# Patient Record
Sex: FEMALE | Race: ASIAN | NOT HISPANIC OR LATINO
[De-identification: names, ages, dates, MRNs, and addresses within clinical notes are randomized per-mention and may not be internally consistent; named-entity substitution may affect disease eponyms.]

---

## 2021-04-21 ENCOUNTER — APPOINTMENT (OUTPATIENT)
Dept: ORTHOPEDIC SURGERY | Facility: CLINIC | Age: 61
End: 2021-04-21

## 2022-10-12 ENCOUNTER — NON-APPOINTMENT (OUTPATIENT)
Age: 62
End: 2022-10-12

## 2022-10-12 ENCOUNTER — APPOINTMENT (OUTPATIENT)
Dept: ORTHOPEDIC SURGERY | Facility: CLINIC | Age: 62
End: 2022-10-12

## 2022-10-12 VITALS — HEIGHT: 58 IN | WEIGHT: 105 LBS | BODY MASS INDEX: 22.04 KG/M2

## 2022-10-12 DIAGNOSIS — G89.29 CERVICALGIA: ICD-10-CM

## 2022-10-12 DIAGNOSIS — M54.2 CERVICALGIA: ICD-10-CM

## 2022-10-12 DIAGNOSIS — Z86.79 PERSONAL HISTORY OF OTHER DISEASES OF THE CIRCULATORY SYSTEM: ICD-10-CM

## 2022-10-12 PROBLEM — Z00.00 ENCOUNTER FOR PREVENTIVE HEALTH EXAMINATION: Status: ACTIVE | Noted: 2022-10-12

## 2022-10-12 PROCEDURE — 99204 OFFICE O/P NEW MOD 45 MIN: CPT

## 2022-10-12 PROCEDURE — 72050 X-RAY EXAM NECK SPINE 4/5VWS: CPT

## 2022-10-12 NOTE — ASSESSMENT
[FreeTextEntry1] : 62 year old female presents with acute exacerbation of chronic neck pain.  She had a fall in 2020 but unsure if it is related.  She denies radicular pain. At times she has numbness in her hands in the morning. She is otherwise neurologically intact. The patient was given a referral for physical therapy.  She was given prescription for Meloxicam.  She will followup in 4-6 weeks. We discussed red flag symptoms that would require emergent evaluation. She knows to call with any questions or concerns or if her symptoms acutely worsen.

## 2022-10-12 NOTE — HISTORY OF PRESENT ILLNESS
[de-identified] : 62 year old female presents with acute exacerbation of chronic neck pain.  She had a fall in 2020 but unsure if it is related.  She denies radicular pain, recent illness, fevers, weakness, balance problems, saddle anesthesia, urinary retention or fecal incontinence. At times she has numbness in her hands in the morning.  She takes Tylenol with some relief.

## 2022-10-12 NOTE — PHYSICAL EXAM
[de-identified] : General: No acute distress, conversant, well-nourished.\par Head: Normocephalic, atraumatic\par Neck: trachea midline, FROM\par Heart: normotensive and normal rate and rhythm\par Lungs: No labored breathing\par Skin: No abrasions, no rashes, no edema\par Psych: Alert and oriented to person, place and time\par Extremities: no peripheral edema or digital cyanosis\par Gait: Normal gait. Can perform tandem gait.  \par Vascular: warm and well perfused distally, palpable distal pulses\par \par MSK:\par Spine: \par No tenderness to palpation.  No step-off, no deformity.\par \par NEURO:\par Sensation \par          Left           \par C5     2/2               \par C6     2/2               \par C7     2/2               \par C8     2/2              \par T1     2/2             \par \par          Right         \par C5     2/2               \par C6     2/2               \par C7     2/2               \par C8     2/2              \par T1     2/2      \par \par Motor: \par                                                Left             \par C5 (deltoid abduction)             5/5               \par C6 (biceps flexion)                   5/5                \par C7 (triceps extension)             5/5               \par C8 (finger flexion)                     5/5               \par T1 (interosseous)                     5/5           \par \par                                                Right           \par C5 (deltoid abduction)             5/5               \par C6 (biceps flexion)                   5/5                \par C7 (triceps extension)             5/5               \par C8 (finger flexion)                     5/5               \par T1 (interosseous)                     5/5                     \par \par Sensation \par Left L2  -  2/2            \par Left L3  -  2/2\par Left L4  -  2/2\par Left L5  -  2/2\par Left S1  -  2/2\par \par Right L2  -  2/2            \par Right L3  -  2/2\par Right L4  -  2/2\par Right L5  -  2/2\par Right S1  -  2/2\par \par Motor: \par Left L2 (hip flexion)                            5/5                \par Left L3 (knee extension)                   5/5                \par Left L4 (ankle dorsiflexion)                 5/5                \par Left L5 (long toe extensor)                5/5                \par Left S1 (ankle plantar flexion)           5/5\par \par Right L2 (hip flexion)                            5/5                \par Right L3 (knee extension)                   5/5                \par Right L4 (ankle dorsiflexion)                 5/5                \par Right L5 (long toe extensor)                5/5                \par Right S1 (ankle plantar flexion)           5/5\par \par Reflexes: Normal and symmetric\par Negative Spurling’s test.  Negative Pop’s reflex.  \par Negative clonus.  Down-going Babinski [de-identified] : I ordered radiographs to evaluate the patient's symptoms.\par \par Cervical 4 view radiographs taken in the office today show no dislocation or fracture. Cervical spondylosis.  No instability on dynamic series.

## 2023-01-16 ENCOUNTER — RX RENEWAL (OUTPATIENT)
Age: 63
End: 2023-01-16

## 2023-01-16 RX ORDER — MELOXICAM 15 MG/1
15 TABLET ORAL
Qty: 30 | Refills: 0 | Status: ACTIVE | COMMUNITY
Start: 2022-10-12 | End: 1900-01-01